# Patient Record
(demographics unavailable — no encounter records)

---

## 2024-10-11 NOTE — DATA REVIEWED
[de-identified] : brain MRI dated 1/25/22 reports acute/subacute right PCA stroke with petechial hemorrhage, acute left occipital infarct. [de-identified] : extensive hospital records reviewed:\par  EMILIA normal\par  EKG normal sinus rhythm\par  A1c 4.8\par  Cholesterol 139, LDL 76, TSH 1.1\par  Carotid duplex negative\par  CTA of the neck negative\par  CTA of the brain showed occluded distal right PCA\par  hematocrit 31.3. Patient says he has chronic anemia, unclear etiology

## 2024-10-11 NOTE — ASSESSMENT
[FreeTextEntry1] : Status post bilateral occipital strokes as discussed above Main deficit relates to his vision with a left superior quadrantanopsia and some overall decreased visual acuity There is  gait imbalance which may be partially related to the vision.  Hypercoagulable screen by hematology turned out to be unremarkable  A. fib found on his loop recorder.  Had a watchman placed.    Currently on aspirin 325 mg a day.  Off statin due to intolerance with normal cholesterol I stressed the importance of risk factor control  Follow-up here in 6 months and by phone prior to that for any issues that may arise.

## 2024-10-11 NOTE — PHYSICAL EXAM
[General Appearance - Alert] : alert [General Appearance - In No Acute Distress] : in no acute distress [General Appearance - Well-Appearing] : healthy appearing [Oriented To Time, Place, And Person] : oriented to person, place, and time [Impaired Insight] : insight and judgment were intact [FreeTextEntry1] : Short-term recall 2/3. [Affect] : the affect was normal [Person] : oriented to person [Place] : oriented to place [Time] : oriented to time [Short Term Intact] : short term memory impaired [Remote Intact] : remote memory intact [Registration Intact] : recent registration memory intact [Concentration Intact] : normal concentrating ability [Naming Objects] : no difficulty naming common objects [Fluency] : fluency intact [Comprehension] : comprehension intact [Past History] : adequate knowledge of personal past history [Cranial Nerves Oculomotor (III)] : extraocular motion intact [Cranial Nerves Trigeminal (V)] : facial sensation intact symmetrically [Cranial Nerves Facial (VII)] : face symmetrical [Cranial Nerves Glossopharyngeal (IX)] : tongue and palate midline [Cranial Nerves Vestibulocochlear (VIII)] : hearing was intact bilaterally [Cranial Nerves Accessory (XI - Cranial And Spinal)] : head turning and shoulder shrug symmetric [Cranial Nerves Hypoglossal (XII)] : there was no tongue deviation with protrusion [Motor Tone] : muscle tone was normal in all four extremities [Motor Strength] : muscle strength was normal in all four extremities [No Muscle Atrophy] : normal bulk in all four extremities [Paresis Pronator Drift Right-Sided] : no pronator drift on the right [Paresis Pronator Drift Left-Sided] : no pronator drift on the left [Sensation Tactile Decrease] : light touch was intact [Sensation Pain / Temperature Decrease] : pain and temperature was intact [Past-pointing] : there was no past-pointing [Tremor] : no tremor present [Coordination - Dysmetria Impaired Finger-to-Nose Bilateral] : not present [Coordination - Dysmetria Impaired Heel-to-Shin Bilateral] : not present [2+] : Patella left 2+ [FreeTextEntry5] : There is a left superior quadrantanopsia, overall vision appears decreased. [FreeTextEntry8] : Gait unsteady, typically uses a walker [PERRL With Normal Accommodation] : pupils were equal in size, round, reactive to light, with normal accommodation [Extraocular Movements] : extraocular movements were intact

## 2024-10-11 NOTE — HISTORY OF PRESENT ILLNESS
Lab called about BNP add on  
 [FreeTextEntry1] : I saw him initially 2/3/2022 with the following history.   He is here with his finghiae. On 1/16/22 he developed some chest pain. Following day had headache and trouble performing simple tasks. he went [facility]. He was diagnosed with a stroke which turned out to be right occipital. He was discharged from there 1/20/22. 2 days later he went back to the hospital because of chest pressure and he was transferred to [facility]. On 1/24 developed vision loss. Repeat imaging studies showed a new left occipital stroke. Extensive hospital records that they bring with them have been reviewed and are summarized below. Workup for specific etiologies including EMILIA was negative. Had a loop recorder implanted and is following with cardiology.  His major residual deficit is vision disturbance. He also has some imbalance which is partly due to the visual loss.  He was discharged on Keppra he  never had a seizure. Also on a statin and aspirin 81 mg a day.  His prior medical history was negative for hypertension, diabetes, hyperlipidemia. He is treated for HIV. He did have prostatectomy on 12/17/21 for prostate cancer. He developed Covid 12/21 and recovered from that. Former smoker. Mother had 2 strokes.  He has been employed drafting and designing swimming pool covers  We tapered him off the Keppra.  I referred him for a hypercoagulable screen.  There was mild elevation of anticardiolipin antibody IgM which was felt by the hematologist to be of questionable significance.  It was repeated and came back normal.  Otherwise hypercoagulable screen was negative.  Most recent cholesterol 125 with LDL 61.  Physical therapy has been completed.  He is working from home at light duty.  He still has periods of fatigue.  He saw an ophthalmologist had visual field testing which confirmed a left superior quadrantanopsia with some mild impairment in the left inferior quadrant as well  His loop recorder demonstrated atrial fibrillation on 5/23/2022.  He could not tolerate anticoagulation.  He had a watchman inserted on 8/5/2022.    Was evaluated in [facility] 1/5/2023 for vertigo.  CT scan of the head, CTA of the head and neck, and CT perfusion showed no acute pathology.  Presumed to be peripheral vertigo.  Never had a brain MRI.  Last seen February 2023 with the following history.  Now on aspirin.  Off the statin due to intolerance and his cholesterol has been normal.  Complains of intermittent numbness of the right leg.  Says this might of been going on since the stroke but he is not sure.  He has vascular testing pending.  Returned 4/10/2024.  Vascular testing was normal.  I did EMG and nerve conduction studies on his legs which were also normal He reports today that about 9 days ago he felt weakness in both his legs.  The right leg improved after a day while it took the left leg 2 to 3 days to get back to normal.  Now legs are both normal.  He has had no recurrence and no prior similar episodes.  Has no back pain or pain in the legs. Is back to working full-time.  Returns today, 10/11/2024.  I had increased his aspirin to 325 mg a day which she is maintained on. No new neurological complaints.

## 2025-02-28 NOTE — DISCUSSION/SUMMARY
[FreeTextEntry1] : IMP  Obesity ILA Compliant with and benefiting from nocturnal CPAP  Plan  Weight loss Nasal APAP Yearly FU

## 2025-02-28 NOTE — HISTORY OF PRESENT ILLNESS
[TextBox_4] : 2/28/25  My first visit with this patient  62M Obese Atrial fibrillation - Watchman CVA - no residua HIV Prostatectomy for Cancer  PSG 2/21/22: AHI=28.2; RDI=41  Set up with APAP Richelle Velez; 6/30/22 6-14 (3) Compliant with and benefiting from nocturnal CPAP P10 pillows

## 2025-03-29 NOTE — HISTORY OF PRESENT ILLNESS
[FreeTextEntry1] : 63 yo male with history of prostate cancer presents today for a follow-up visit. S/p RALP 12/17/2021 Path: GG2, GS7 (3+4) (pT3a, pN0)  Recent PSA: <0.01 (2.1.25) PSA has remained undetectable since surgery.  Hx of HIV, stroke  Patient is doing well overall. Denies any urinary complaints.

## 2025-03-29 NOTE — END OF VISIT
Problem: Pressure Injury, Risk for  Goal: # Skin remains intact  Outcome: Outcome Met, Continue evaluating goal progress toward completion     Problem: At Risk for Falls  Goal: # Patient does not fall  Outcome: Outcome Met, Continue evaluating goal progress toward completion     Problem: Pain  Goal: #Acceptable pain level achieved/maintained at rest using NRS/Faces  Description: This goal is used for patients who can self-report.  Acceptable means the level is at or below the identified comfort/function goal.  Outcome: Outcome Met, Continue evaluating goal progress toward completion      [Time Spent: ___ minutes] : I have spent [unfilled] minutes of time on the encounter which excludes teaching and separately reported services.

## 2025-03-29 NOTE — ASSESSMENT
[FreeTextEntry1] : Discussed PSA results with patient- undetectable. No complaints.  Repeat PSA in 6 months. Next follow-up appointment in 6 months. All questions answered patient agreeable with plan. 20-minute discussion for prostate cancer follow-up and review of labs.

## 2025-03-29 NOTE — ADDENDUM
[FreeTextEntry1] : I, Christine Darby assisted in documentation on 03/24/2025 at acting as a scribe for and in the presence of Dr. Danny Guthrie.

## 2025-04-11 NOTE — HISTORY OF PRESENT ILLNESS
[FreeTextEntry1] : I saw him initially 2/3/2022 with the following history.   He is here with his finghiae. On 1/16/22 he developed some chest pain. Following day had headache and trouble performing simple tasks. he went [facility]. He was diagnosed with a stroke which turned out to be right occipital. He was discharged from there 1/20/22. 2 days later he went back to the hospital because of chest pressure and he was transferred to [facility]. On 1/24 developed vision loss. Repeat imaging studies showed a new left occipital stroke. Extensive hospital records that they bring with them have been reviewed and are summarized below. Workup for specific etiologies including EMILIA was negative. Had a loop recorder implanted and is following with cardiology.  His major residual deficit is vision disturbance. He also has some imbalance which is partly due to the visual loss.  He was discharged on Keppra he  never had a seizure. Also on a statin and aspirin 81 mg a day.  His prior medical history was negative for hypertension, diabetes, hyperlipidemia. He is treated for HIV. He did have prostatectomy on 12/17/21 for prostate cancer. He developed Covid 12/21 and recovered from that. Former smoker. Mother had 2 strokes.  He has been employed drafting and designing swimming pool covers  We tapered him off the Keppra.  I referred him for a hypercoagulable screen.  There was mild elevation of anticardiolipin antibody IgM which was felt by the hematologist to be of questionable significance.  It was repeated and came back normal.  Otherwise hypercoagulable screen was negative.  Most recent cholesterol 125 with LDL 61.  Physical therapy has been completed.  He is working from home at light duty.  He still has periods of fatigue.  He saw an ophthalmologist had visual field testing which confirmed a left superior quadrantanopsia with some mild impairment in the left inferior quadrant as well  His loop recorder demonstrated atrial fibrillation on 5/23/2022.  He could not tolerate anticoagulation.  He had a watchman inserted on 8/5/2022.    Was evaluated in [facility] 1/5/2023 for vertigo.  CT scan of the head, CTA of the head and neck, and CT perfusion showed no acute pathology.  Presumed to be peripheral vertigo.  Never had a brain MRI.  Last seen February 2023 with the following history.  Now on aspirin.  Off the statin due to intolerance and his cholesterol has been normal.  Complains of intermittent numbness of the right leg.  Says this might of been going on since the stroke but he is not sure.  He has vascular testing pending.  Returned 4/10/2024.  Vascular testing was normal.  I did EMG and nerve conduction studies on his legs which were also normal He reports today that about 9 days ago he felt weakness in both his legs.  The right leg improved after a day while it took the left leg 2 to 3 days to get back to normal.  Now legs are both normal.  He has had no recurrence and no prior similar episodes.  Has no back pain or pain in the legs. Is back to working full-time.  4/11/2025: Seen in revisit today Says he has remained neurologically stable without any further complaints Now on aspirin 325 mg a day Says his cholesterol has been good Typically uses a rolling walker because of chronic vertigo

## 2025-04-11 NOTE — DATA REVIEWED
[de-identified] : brain MRI dated 1/25/22 reports acute/subacute right PCA stroke with petechial hemorrhage, acute left occipital infarct. [de-identified] : extensive hospital records reviewed:\par  EMILIA normal\par  EKG normal sinus rhythm\par  A1c 4.8\par  Cholesterol 139, LDL 76, TSH 1.1\par  Carotid duplex negative\par  CTA of the neck negative\par  CTA of the brain showed occluded distal right PCA\par  hematocrit 31.3. Patient says he has chronic anemia, unclear etiology

## 2025-04-11 NOTE — ASSESSMENT
[FreeTextEntry1] : Status post bilateral occipital strokes as discussed above Main deficit relates to his vision with a left superior quadrantanopsia and some overall decreased visual acuity There is  gait imbalance which may be partially related to the vision and also has chronic vertigo.  Hypercoagulable screen by hematology turned out to be unremarkable  A. fib found on his loop recorder.  Had a watchman placed.    Currently on aspirin 325 mg a day.  Off statin due to intolerance with normal cholesterol I stressed the importance of risk factor control  Examination is at its baseline   Follow-up here in 6 months and by phone prior to that for any issues that may arise.

## 2025-04-11 NOTE — PHYSICAL EXAM
[General Appearance - Alert] : alert [General Appearance - In No Acute Distress] : in no acute distress [General Appearance - Well-Appearing] : healthy appearing [Oriented To Time, Place, And Person] : oriented to person, place, and time [Impaired Insight] : insight and judgment were intact [Affect] : the affect was normal [FreeTextEntry1] : Short-term recall 2/3. [Person] : oriented to person [Place] : oriented to place [Time] : oriented to time [Short Term Intact] : short term memory impaired [Remote Intact] : remote memory intact [Registration Intact] : recent registration memory intact [Concentration Intact] : normal concentrating ability [Naming Objects] : no difficulty naming common objects [Fluency] : fluency intact [Comprehension] : comprehension intact [Past History] : adequate knowledge of personal past history [Cranial Nerves Oculomotor (III)] : extraocular motion intact [Cranial Nerves Trigeminal (V)] : facial sensation intact symmetrically [Cranial Nerves Facial (VII)] : face symmetrical [Cranial Nerves Vestibulocochlear (VIII)] : hearing was intact bilaterally [Cranial Nerves Glossopharyngeal (IX)] : tongue and palate midline [Cranial Nerves Accessory (XI - Cranial And Spinal)] : head turning and shoulder shrug symmetric [Cranial Nerves Hypoglossal (XII)] : there was no tongue deviation with protrusion [Motor Tone] : muscle tone was normal in all four extremities [Motor Strength] : muscle strength was normal in all four extremities [No Muscle Atrophy] : normal bulk in all four extremities [Paresis Pronator Drift Right-Sided] : no pronator drift on the right [Paresis Pronator Drift Left-Sided] : no pronator drift on the left [Sensation Tactile Decrease] : light touch was intact [Sensation Pain / Temperature Decrease] : pain and temperature was intact [Past-pointing] : there was no past-pointing [Tremor] : no tremor present [Coordination - Dysmetria Impaired Finger-to-Nose Bilateral] : not present [Coordination - Dysmetria Impaired Heel-to-Shin Bilateral] : not present [2+] : Patella left 2+ [FreeTextEntry5] : There is a left superior quadrantanopsia, overall vision appears decreased. [FreeTextEntry8] : Gait unsteady, typically uses a walker but able to walk independently in the office [PERRL With Normal Accommodation] : pupils were equal in size, round, reactive to light, with normal accommodation [Extraocular Movements] : extraocular movements were intact

## 2025-06-03 NOTE — PHYSICAL EXAM
[Good Air Entry] : good air entry [No Respiratory Distress] : no respiratory distress  [Normal] : alert and oriented, normal memory

## 2025-06-03 NOTE — DISCUSSION/SUMMARY
[FreeTextEntry1] : 62M with pmhx HIV on anti-retroviral therapy, remote Covid infection, CVA x 2 (both 1/2022) s/p ILR, and paroxysmal atrial fibrillation s/p LAAO presents for f/u. Minimal AF noted on ILR. Not on any medications, none initiated given burden is so low. Maintain ASA only, watchman is well seated with no thrombus or leak.  However, noted frequent PVCs on ecg today which appears to be new c/w prior. Ordered for three day holter monitor to establish burden. TTE normal. No symptoms. Furtherr plan pending PVC burden. F/u in six months or sooner prn. [EKG obtained to assist in diagnosis and management of assessed problem(s)] : EKG obtained to assist in diagnosis and management of assessed problem(s)

## 2025-06-03 NOTE — HISTORY OF PRESENT ILLNESS
[FreeTextEntry1] : 62M with pmhx HIV on anti-retroviral therapy, remote Covid infection, CVA x 2 (both 1/2022) s/p ILR, and paroxysmal atrial fibrillation s/p LAAO presents for f/u.  To summarize, he is unable to tolerate long term anticoagulation due to interaction with antiviral medications: Prezcobix is a P-glycoprotein inhibitor and may increase the serum concentration of NOACS resulting in increased risk of bleeding. Warfarin was recommended instead, however he was unable to tolerate warfarin therapy because of severe headache/migraines. After consultation with his ID specialist, it was recommended that he not change Prezcobix to another medication. Accordingly, he has a high risk of CVA without anticoagulation, but also has a high risk of significant bleeding while on NOACs and has significant side effects with warfarin that impact his quality of life. After a shared decision making process with the patient, his family, Dr. Barriga and Dr. Mead, he is now s/p LAAO with watchman implantation on 8/5/22.  EMILIA performed 9/28/22 revealed well seated watchman device without thrombus and no sarahy-device leak. EMILIA performed 07/2023 well seated device no thrombus or leak (one year samy)  Pt offers no acute complaints today. Denies chest pain, sob, PATINO, palpitations, syncope, lightheadedness, dizziness. ILR interrogated, one, four minute episode of AF in 03/2025. Otherwise, no events.   ECG today SR with frequent PVCs